# Patient Record
Sex: FEMALE | Race: WHITE | NOT HISPANIC OR LATINO | URBAN - METROPOLITAN AREA
[De-identification: names, ages, dates, MRNs, and addresses within clinical notes are randomized per-mention and may not be internally consistent; named-entity substitution may affect disease eponyms.]

---

## 2025-02-18 ENCOUNTER — OFFICE VISIT (OUTPATIENT)
Dept: URGENT CARE | Age: 20
End: 2025-02-18
Payer: COMMERCIAL

## 2025-02-18 VITALS
SYSTOLIC BLOOD PRESSURE: 119 MMHG | TEMPERATURE: 99.4 F | RESPIRATION RATE: 16 BRPM | OXYGEN SATURATION: 98 % | DIASTOLIC BLOOD PRESSURE: 80 MMHG | HEART RATE: 113 BPM

## 2025-02-18 DIAGNOSIS — R50.9 FEVER, UNSPECIFIED FEVER CAUSE: ICD-10-CM

## 2025-02-18 DIAGNOSIS — R68.89 FLU-LIKE SYMPTOMS: ICD-10-CM

## 2025-02-18 DIAGNOSIS — J10.1 INFLUENZA A: Primary | ICD-10-CM

## 2025-02-18 DIAGNOSIS — R05.1 ACUTE COUGH: ICD-10-CM

## 2025-02-18 DIAGNOSIS — J01.40 ACUTE PANSINUSITIS, RECURRENCE NOT SPECIFIED: ICD-10-CM

## 2025-02-18 DIAGNOSIS — H66.001 ACUTE SUPPURATIVE OTITIS MEDIA OF RIGHT EAR WITHOUT SPONTANEOUS RUPTURE OF TYMPANIC MEMBRANE, RECURRENCE NOT SPECIFIED: ICD-10-CM

## 2025-02-18 DIAGNOSIS — Z20.822 SUSPECTED COVID-19 VIRUS INFECTION: ICD-10-CM

## 2025-02-18 DIAGNOSIS — R52 BODY ACHES: ICD-10-CM

## 2025-02-18 DIAGNOSIS — R11.2 NAUSEA AND VOMITING, UNSPECIFIED VOMITING TYPE: ICD-10-CM

## 2025-02-18 LAB
POC RAPID INFLUENZA A: POSITIVE
POC RAPID INFLUENZA B: NEGATIVE
POC SARS-COV-2 AG BINAX: NORMAL

## 2025-02-18 RX ORDER — OSELTAMIVIR PHOSPHATE 75 MG/1
75 CAPSULE ORAL EVERY 12 HOURS
Qty: 10 CAPSULE | Refills: 0 | Status: SHIPPED | OUTPATIENT
Start: 2025-02-18 | End: 2025-02-23

## 2025-02-18 RX ORDER — BROMPHENIRAMINE MALEATE, PSEUDOEPHEDRINE HYDROCHLORIDE, AND DEXTROMETHORPHAN HYDROBROMIDE 2; 30; 10 MG/5ML; MG/5ML; MG/5ML
10 SYRUP ORAL 4 TIMES DAILY PRN
Qty: 240 ML | Refills: 0 | Status: SHIPPED | OUTPATIENT
Start: 2025-02-18 | End: 2025-02-28

## 2025-02-18 RX ORDER — AMOXICILLIN AND CLAVULANATE POTASSIUM 875; 125 MG/1; MG/1
1 TABLET, FILM COATED ORAL 2 TIMES DAILY
Qty: 20 TABLET | Refills: 0 | Status: SHIPPED | OUTPATIENT
Start: 2025-02-18 | End: 2025-02-28

## 2025-02-18 RX ORDER — IBUPROFEN 800 MG/1
800 TABLET ORAL 3 TIMES DAILY PRN
Qty: 60 TABLET | Refills: 0 | Status: SHIPPED | OUTPATIENT
Start: 2025-02-18 | End: 2025-04-19

## 2025-02-18 RX ORDER — ONDANSETRON 4 MG/1
8 TABLET, ORALLY DISINTEGRATING ORAL 2 TIMES DAILY PRN
Qty: 20 TABLET | Refills: 0 | Status: SHIPPED | OUTPATIENT
Start: 2025-02-18 | End: 2025-02-23

## 2025-02-18 ASSESSMENT — ENCOUNTER SYMPTOMS
SHORTNESS OF BREATH: 0
HEMOPTYSIS: 0
HEARTBURN: 0
RHINORRHEA: 1
CHILLS: 1
SORE THROAT: 1
COUGH: 1
FEVER: 1
SWEATS: 0
WHEEZING: 0
HEADACHES: 1
WEIGHT LOSS: 0
MYALGIAS: 1

## 2025-02-18 NOTE — PATIENT INSTRUCTIONS
Treatment and supportive care of influenza discussed. Adverse effects of Tamiflu discussed. Adverse effects include nausea, vomiting and diarrhea. Take Tamiflu as instructed. Symptoms should improve in 3-4 days.  Take the antibiotic with food.  Eat yogurt or take probiotic once a day.  Symptoms should improve in 2 to 3 days.   Take Bromfed as needed for cough, nasal congestion, and/or allergies.  Wash your hands often, especially after coughing or touching your nose or mouth.  Use disposable tissues instead of handkerchiefs.  Increase fluid intake and rest as needed.  Take ibuprofen as needed for aches and pain and/or fever.  Return or follow-up with primary care provider if symptoms did not improve.  Call 911 or go to the nearest emergency room if symptoms became severe such as fever of 102.5 degrees Fahrenheit or 39.2 degrees Celsius, severe pain, shortness of breath, chest tightness.

## 2025-02-18 NOTE — LETTER
February 18, 2025     Patient: Dyana Soliz   YOB: 2005   Date of Visit: 2/18/2025       To Whom It May Concern:    Dyana Soliz was seen in my clinic on 2/18/2025. Please excuse Dyana for her absence from work on this day to make the appointment today through 2/21/25.    If you have any questions or concerns, please don't hesitate to call.         Sincerely,         ARIANNE Lauren        CC: No Recipients

## 2025-02-18 NOTE — LETTER
February 18, 2025     Patient: Dyana Soliz   YOB: 2005   Date of Visit: 2/18/2025       To Whom It May Concern:    Dyana Soliz was seen in my clinic on 2/18/2025. Please excuse Dyana for her absence from school on this day to make the appointment through 2/21/25.      If you have any questions or concerns, please don't hesitate to call.         Sincerely,         ARIANNE Lauren        CC: No Recipients

## 2025-02-18 NOTE — PROGRESS NOTES
Subjective   Patient ID: Dyana Soliz is a 19 y.o. female. They present today with a chief complaint of Generalized Body Aches (Chills., fatigue, headache fatigue).    History of Present Illness    History provided by:  Patient   used: No    Cough  This is a new problem. The current episode started yesterday. The problem has been gradually worsening. The problem occurs constantly. The cough is Productive of purulent sputum. Associated symptoms include chills, ear pain, a fever, headaches, myalgias, nasal congestion, postnasal drip, rhinorrhea and a sore throat. Pertinent negatives include no chest pain, ear congestion, heartburn, hemoptysis, rash, shortness of breath, sweats, weight loss or wheezing. Nothing aggravates the symptoms. She has tried OTC cough suppressant (Dayquil, Nyquil, acetaminophen) for the symptoms. The treatment provided no relief. There is no history of asthma.       Past Medical History  Allergies as of 02/18/2025    (No Known Allergies)       (Not in a hospital admission)       No past medical history on file.    No past surgical history on file.         Review of Systems  Review of Systems   Constitutional:  Positive for chills and fever. Negative for weight loss.   HENT:  Positive for ear pain, postnasal drip, rhinorrhea and sore throat.    Respiratory:  Positive for cough. Negative for hemoptysis, shortness of breath and wheezing.    Cardiovascular:  Negative for chest pain.   Gastrointestinal:  Negative for heartburn.   Musculoskeletal:  Positive for myalgias.   Skin:  Negative for rash.   Neurological:  Positive for headaches.            Objective    Vitals:    02/18/25 0815   BP: 119/80   Pulse: (!) 113   Resp: 16   Temp: 37.4 °C (99.4 °F)   SpO2: 98%     No LMP recorded.    Physical Exam  Vitals and nursing note reviewed.   Constitutional:       Appearance: Normal appearance.   HENT:      Head: Normocephalic and atraumatic.      Right Ear: Hearing, ear canal and  external ear normal. Tympanic membrane is erythematous.      Left Ear: Hearing, tympanic membrane, ear canal and external ear normal.      Nose: Nasal tenderness, mucosal edema, congestion and rhinorrhea present. No nasal deformity, septal deviation, signs of injury or laceration. Rhinorrhea is purulent.      Right Sinus: Maxillary sinus tenderness and frontal sinus tenderness present.      Left Sinus: Maxillary sinus tenderness and frontal sinus tenderness present.      Mouth/Throat:      Lips: Pink.      Mouth: Mucous membranes are moist.      Pharynx: Uvula midline. Posterior oropharyngeal erythema present.      Tonsils: No tonsillar exudate or tonsillar abscesses. 1+ on the right. 1+ on the left.   Cardiovascular:      Rate and Rhythm: Regular rhythm. Tachycardia present.      Heart sounds: Normal heart sounds.   Pulmonary:      Effort: Pulmonary effort is normal.      Breath sounds: Normal breath sounds and air entry.   Musculoskeletal:      Cervical back: Normal range of motion and neck supple.   Lymphadenopathy:      Cervical: No cervical adenopathy.   Neurological:      Mental Status: She is alert.   Psychiatric:         Mood and Affect: Mood normal.         Behavior: Behavior normal.         Procedures    Point of Care Test & Imaging Results from this visit  Results for orders placed or performed in visit on 02/18/25   POCT Influenza A/B manually resulted   Result Value Ref Range    POC Rapid Influenza A Positive (A) Negative    POC Rapid Influenza B Negative Negative   POCT BinaxNOW Covid-19 Ag Card manually resulted   Result Value Ref Range    POC ALEXYS-COV-2 AG  Presumptive negative test for SARS-CoV-2 (no antigen detected)     Presumptive negative test for SARS-CoV-2 (no antigen detected)      No results found.    Diagnostic study results (if any) were reviewed by ARIANNE Lauren.    Assessment/Plan   Allergies, medications, history, and pertinent labs/EKGs/Imaging reviewed by ARIANNE Lauren.      Medical Decision Making  Treatment and supportive care of influenza discussed. Adverse effects of Tamiflu discussed. Adverse effects include nausea, vomiting and diarrhea. Take Tamiflu as instructed. Symptoms should improve in 3-4 days.  Take the antibiotic with food.  Eat yogurt or take probiotic once a day.  Symptoms should improve in 2 to 3 days.   Take Bromfed as needed for cough, nasal congestion, and/or allergies.  Wash your hands often, especially after coughing or touching your nose or mouth.  Use disposable tissues instead of handkerchiefs.  Increase fluid intake and rest as needed.  Take ibuprofen as needed for aches and pain and/or fever.  Return or follow-up with primary care provider if symptoms did not improve.  Call 911 or go to the nearest emergency room if symptoms became severe such as fever of 102.5 degrees Fahrenheit or 39.2 degrees Celsius, severe pain, shortness of breath, chest tightness.   Advised to take acetaminophen and/or ibuprofen to control the fever, aches and/or pain. Recheck heart rate at home.  If your heart rates above 100 beats per minute persist, to follow up with your primary care provider.   Patient verbalized understanding of the instructions and left in stable condition.    Orders and Diagnoses  Diagnoses and all orders for this visit:  Influenza A  -     oseltamivir (Tamiflu) 75 mg capsule; Take 1 capsule (75 mg) by mouth every 12 hours for 5 days.  -     ibuprofen 800 mg tablet; Take 1 tablet (800 mg) by mouth 3 times a day as needed for mild pain (1 - 3) (pain).  Acute suppurative otitis media of right ear without spontaneous rupture of tympanic membrane, recurrence not specified  -     amoxicillin-pot clavulanate (Augmentin) 875-125 mg tablet; Take 1 tablet by mouth 2 times a day for 10 days.  Acute pansinusitis, recurrence not specified  -     amoxicillin-pot clavulanate (Augmentin) 875-125 mg tablet; Take 1 tablet by mouth 2 times a day for 10 days.  -      brompheniramine-pseudoeph-DM 2-30-10 mg/5 mL syrup; Take 10 mL by mouth 4 times a day as needed for congestion, cough or allergies for up to 10 days.  Flu-like symptoms  -     POCT Influenza A/B manually resulted  Suspected COVID-19 virus infection  -     POCT BinaxNOW Covid-19 Ag Card manually resulted  Acute cough  -     brompheniramine-pseudoeph-DM 2-30-10 mg/5 mL syrup; Take 10 mL by mouth 4 times a day as needed for congestion, cough or allergies for up to 10 days.  Nausea and vomiting, unspecified vomiting type  -     ondansetron ODT (Zofran-ODT) 4 mg disintegrating tablet; Dissolve 2 tablets (8 mg) in the mouth 2 times a day as needed for nausea or vomiting for up to 5 days.  Fever, unspecified fever cause  -     ibuprofen 800 mg tablet; Take 1 tablet (800 mg) by mouth 3 times a day as needed for mild pain (1 - 3) (pain).  Body aches  -     ibuprofen 800 mg tablet; Take 1 tablet (800 mg) by mouth 3 times a day as needed for mild pain (1 - 3) (pain).      Medical Admin Record      Patient disposition: Home    Electronically signed by ARIANNE Lauren  8:32 AM